# Patient Record
Sex: MALE | Race: WHITE | NOT HISPANIC OR LATINO | Employment: FULL TIME | URBAN - METROPOLITAN AREA
[De-identification: names, ages, dates, MRNs, and addresses within clinical notes are randomized per-mention and may not be internally consistent; named-entity substitution may affect disease eponyms.]

---

## 2021-10-08 PROCEDURE — U0004 COV-19 TEST NON-CDC HGH THRU: HCPCS | Performed by: NURSE PRACTITIONER

## 2022-06-28 ENCOUNTER — OFFICE VISIT (OUTPATIENT)
Dept: FAMILY MEDICINE CLINIC | Facility: CLINIC | Age: 32
End: 2022-06-28

## 2022-06-28 VITALS
WEIGHT: 173 LBS | OXYGEN SATURATION: 97 % | DIASTOLIC BLOOD PRESSURE: 74 MMHG | RESPIRATION RATE: 20 BRPM | BODY MASS INDEX: 25.62 KG/M2 | HEART RATE: 80 BPM | SYSTOLIC BLOOD PRESSURE: 118 MMHG | HEIGHT: 69 IN

## 2022-06-28 DIAGNOSIS — Z13.29 SCREENING FOR ENDOCRINE DISORDER: ICD-10-CM

## 2022-06-28 DIAGNOSIS — Z13.0 SCREENING FOR DEFICIENCY ANEMIA: ICD-10-CM

## 2022-06-28 DIAGNOSIS — G89.29 CHRONIC PAIN OF RIGHT ANKLE: ICD-10-CM

## 2022-06-28 DIAGNOSIS — Z00.00 WELL ADULT EXAM: Primary | ICD-10-CM

## 2022-06-28 DIAGNOSIS — M25.571 CHRONIC PAIN OF RIGHT ANKLE: ICD-10-CM

## 2022-06-28 DIAGNOSIS — Z23 NEED FOR VACCINE FOR TD (TETANUS-DIPHTHERIA): ICD-10-CM

## 2022-06-28 DIAGNOSIS — Z13.220 SCREENING FOR HYPERLIPIDEMIA: ICD-10-CM

## 2022-06-28 PROCEDURE — 90715 TDAP VACCINE 7 YRS/> IM: CPT | Performed by: FAMILY MEDICINE

## 2022-06-28 PROCEDURE — 99385 PREV VISIT NEW AGE 18-39: CPT | Performed by: FAMILY MEDICINE

## 2022-06-28 PROCEDURE — 90471 IMMUNIZATION ADMIN: CPT | Performed by: FAMILY MEDICINE

## 2022-06-28 NOTE — PROGRESS NOTES
"New Patient Office Visit      Patient Name: Haider Guerra  : 1990   MRN: [id number]     Chief Complaint:    Chief Complaint   Patient presents with   • Establish Care       Subjective   History of Present Illness    History of Present Illness: Haider Guerra is a 31 y.o. male who is here today to establish care.    Previous primary care: Years ago in [place]    Chronic health conditions:  None    Other physicians currently involved in patient's care:  Patient Care Team:  Oscar Conner,  as PCP - General (Family Medicine)    Acute Concerns:  HPI  The patient reports that he has not had a physical in years. He states that he feels mostly fine. He reports that he was a  in his uterus, so he will probably get skin cancer at some point, but other than that, he is okay. He reports that he wears sunscreen aggressively. He reports that he has a skin check scheduled as well as part of the [facility]. He reports that he is going to be seeing someone over at [facility]. He reports that his resting heart rate is usually 80 bpm, which is probably higher than it should be. He reports that he is not exercising as much as he should be. 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 He reports that he drinks a lot of coffee. He reports that he drinks 2 to 3 liters of water a day. He reports that he is trying to cut back on his coffee. He reports that up until fairly recently, it was 3 to 4 cups of coffee, close to 300 to 400 mg. He reports that he used to be in kickboxing, and all that stuff, his risk of heart rate would be lower, so it does not match with that. He reports that it is also genetic, probably related with some anxiety. He denies any " palpitations or hearing problems. He reports that he is . He reports that he does not have any children yet. He reports that they are trying to conceive. He reports that they found out 3 weeks ago that his wife is actually pregnant. He reports that he needs to figure out things he needs to make sure he is not going to die anytime soon.    He reports that he saw a dentist last week. He reports that he already had the dentist scheduled before they found out. He reports that he had the dentist on Tuesday, and apparently they take his blood pressure at the dentist. He reports that his blood pressure was 195 mmHg, and they were really concerned. He reports that he does not remember when his last tetanus shot was. He reports that he might need to have his foot broken. He reports that he did not have insurance at the time, so he wrapped it up and kept going through the wear. He reports that it ended up healing wrong. He reports that he still feels it every now and then. He reports that he did not seek medical attention at that time. He reports that it does not give him pain pretty regularly. He reports that it is one of those things where it will crack, but it is not painful.    This patient is accompanied by their self who contributes to the history of their care.    The following portions of the patient's history were reviewed and updated as appropriate: allergies, current medications, past family history, past medical history, past social history, past surgical history and problem list.    Subjective      Review of Systems:   Review of Systems - See HPI and new patient paperwork scanned into chart    Past Medical History: History reviewed. No pertinent past medical history.    Past Surgical History:   Past Surgical History:   Procedure Laterality Date   • WISDOM TOOTH EXTRACTION         Family History:   Family History   Problem Relation Age of Onset   • Stroke Maternal Grandfather    • Liver disease Paternal  "Grandfather        Social History:   Social History     Socioeconomic History   • Marital status: Single   Tobacco Use   • Smoking status: Never Smoker   • Smokeless tobacco: Never Used   Vaping Use   • Vaping Use: Never used   Substance and Sexual Activity   • Alcohol use: Yes   • Drug use: Never   • Sexual activity: Defer       Tobacco History:   Social History     Tobacco Use   Smoking Status Never Smoker   Smokeless Tobacco Never Used       Medications:   No current outpatient medications on file.    Allergies:   No Known Allergies    Objective   Objective     Physical Exam:  Vital Signs:   Vitals:    06/28/22 0911   BP: 118/74   Pulse: 80   Resp: 20   SpO2: 97%   Weight: 78.5 kg (173 lb)   Height: 174 cm (68.5\")     Body mass index is 25.92 kg/m².     Physical Exam  Nursing note reviewed  Const: NAD, A&Ox4, Pleasant, Cooperative  Eyes: EOMI, no conjunctivitis  ENT: No nasal discharge present, neck supple  Cardiac: Regular rate and rhythm, no cyanosis  Resp: Respiratory rate within normal limits, no increased work of breathing, no audible wheezing or retractions noted  GI: No distention or ascites  MSK: Motor and sensation grossly intact in bilateral upper extremities  Neurologic: CN II-XII grossly intact  Psych: Appropriate mood and behavior.  Skin: Warm, dry  Procedures/Radiology     Procedures  No radiology results for the last 7 days     Assessment & Plan   Assessment / Plan      Assessment/Plan:   Problems Addressed This Visit  Diagnoses and all orders for this visit:    1. Well adult exam (Primary)  Comments:  He will receive his tetanus vaccine today.  He will follow up in 1 year.  Orders:  -     Hepatitis C antibody; Future    2. Need for vaccine for Td (tetanus-diphtheria)  -     Tdap vaccine greater than or equal to 8yo IM    3. Screening for deficiency anemia  -     CBC (No Diff); Future    4. Screening for endocrine disorder  -     Hemoglobin A1c; Future  -     Comprehensive Metabolic Panel; " Future  -     Urinalysis With Microscopic If Indicated (No Culture) - Urine, Clean Catch; Future  -     TSH; Future    5. Screening for hyperlipidemia  -     Lipid Panel; Future    6. Chronic pain of right ankle  Comments:  Chronic popping from old injury. Mildly painful.      Problem List Items Addressed This Visit    None     Visit Diagnoses     Well adult exam    -  Primary    He will receive his tetanus vaccine today.  He will follow up in 1 year.    Relevant Orders    Hepatitis C antibody    Need for vaccine for Td (tetanus-diphtheria)        Relevant Orders    Tdap vaccine greater than or equal to 8yo IM (Completed)    Screening for deficiency anemia        Relevant Orders    CBC (No Diff)    Screening for endocrine disorder        Relevant Orders    Hemoglobin A1c    Comprehensive Metabolic Panel    Urinalysis With Microscopic If Indicated (No Culture) - Urine, Clean Catch    TSH    Screening for hyperlipidemia        Relevant Orders    Lipid Panel    Chronic pain of right ankle        Chronic popping from old injury. Mildly painful.          We will plan to obtain previous records both for chronic preventative care as well as those related to the current episode of care.  Any records that the patient brought with him today were reviewed personally by me during the visit today and will be scanned into the chart for posterity.    The preventative exam has been reviewed in detail.  The patient has been fully counseled on preventative guidelines for vaccines, cancer screenings, and other health maintenance needs. The patient was counseled on maintaining a lifestyle to promote good health and to minimize chronic diseases.  The patient has been assisted with scheduling healthcare procedures for the coming year and given a written document outlining these recommendations. Age-appropriate screening measures have been ordered for the patient today as indicated above.    Patient Instructions       Preventive Care  21-39 Years Old, Male  Preventive care refers to lifestyle choices and visits with your health care provider that can promote health and wellness. This includes:  · A yearly physical exam. This is also called an annual wellness visit.  · Regular dental and eye exams.  · Immunizations.  · Screening for certain conditions.  · Healthy lifestyle choices, such as:  ? Eating a healthy diet.  ? Getting regular exercise.  ? Not using drugs or products that contain nicotine and tobacco.  ? Limiting alcohol use.  What can I expect for my preventive care visit?  Physical exam  Your health care provider may check your:  · Height and weight. These may be used to calculate your BMI (body mass index). BMI is a measurement that tells if you are at a healthy weight.  · Heart rate and blood pressure.  · Body temperature.  · Skin for abnormal spots.  Counseling  Your health care provider may ask you questions about your:  · Past medical problems.  · Family's medical history.  · Alcohol, tobacco, and drug use.  · Emotional well-being.  · Home life and relationship well-being.  · Sexual activity.  · Diet, exercise, and sleep habits.  · Work and work environment.  · Access to firearms.  What immunizations do I need?    Vaccines are usually given at various ages, according to a schedule. Your health care provider will recommend vaccines for you based on your age, medical history, and lifestyle or other factors, such as travel or where you work.  What tests do I need?  Blood tests  · Lipid and cholesterol levels. These may be checked every 5 years starting at age 20.  · Hepatitis C test.  · Hepatitis B test.  Screening    · Diabetes screening. This is done by checking your blood sugar (glucose) after you have not eaten for a while (fasting).  · Genital exam to check for testicular cancer or hernias.  · STD (sexually transmitted disease) testing, if you are at risk.  Talk with your health care provider about your test results, treatment  options, and if necessary, the need for more tests.  Follow these instructions at home:  Eating and drinking    · Eat a healthy diet that includes fresh fruits and vegetables, whole grains, lean protein, and low-fat dairy products.  · Drink enough fluid to keep your urine pale yellow.  · Take vitamin and mineral supplements as recommended by your health care provider.  · Do not drink alcohol if your health care provider tells you not to drink.  · If you drink alcohol:  ? Limit how much you have to 0-2 drinks a day.  ? Be aware of how much alcohol is in your drink. In the [place] one drink equals one 12 oz bottle of beer (355 mL), one 5 oz glass of wine (148 mL), or one 1½ oz glass of hard liquor (44 mL).    Lifestyle  · Take daily care of your teeth and gums. Brush your teeth every morning and night with fluoride toothpaste. Floss one time each day.  · Stay active. Exercise for at least 30 minutes 5 or more days each week.  · Do not use any products that contain nicotine or tobacco, such as cigarettes, e-cigarettes, and chewing tobacco. If you need help quitting, ask your health care provider.  · Do not use drugs.  · If you are sexually active, practice safe sex. Use a condom or other form of protection to prevent STIs (sexually transmitted infections).  · Find healthy ways to cope with stress, such as:  ? Meditation, yoga, or listening to music.  ? Journaling.  ? Talking to a trusted person.  ? Spending time with friends and family.  Safety  · Always wear your seat belt while driving or riding in a vehicle.  · Do not drive:  ? If you have been drinking alcohol. Do not ride with someone who has been drinking.  ? When you are tired or distracted.  ? While texting.  · Wear a helmet and other protective equipment during sports activities.  · If you have firearms in your house, make sure you follow all gun safety procedures.  · Seek help if you have been physically or sexually abused.  What's next?  · Go to your health  care provider once a year for an annual wellness visit.  · Ask your health care provider how often you should have your eyes and teeth checked.  · Stay up to date on all vaccines.  This information is not intended to replace advice given to you by your health care provider. Make sure you discuss any questions you have with your health care provider.  Document Revised: 09/02/2020 Document Reviewed: 12/12/2019  [employer] Patient Education © 2021 [employer]        Follow Up:   Return in 1 year (on 6/28/2023) for annual.    [facility]     DO ARIELLE Luz [address]  [facility]  [address] [facility] [address]  Fax [phone]  Phone [phone]         Transcribed from ambient dictation for Oscar Conner DO by BETTY VEGA.  06/28/22   13:50 EDT    Patient verbalized consent to the visit recording.  I have personally performed the services described in this document as transcribed by the above individual, and it is both accurate and complete.  Oscar Conner DO  6/28/2022  15:52 EDT